# Patient Record
Sex: MALE | Race: WHITE | NOT HISPANIC OR LATINO | Employment: FULL TIME | URBAN - METROPOLITAN AREA
[De-identification: names, ages, dates, MRNs, and addresses within clinical notes are randomized per-mention and may not be internally consistent; named-entity substitution may affect disease eponyms.]

---

## 2023-07-14 ENCOUNTER — HOSPITAL ENCOUNTER (EMERGENCY)
Facility: MEDICAL CENTER | Age: 43
End: 2023-07-14
Attending: EMERGENCY MEDICINE
Payer: MEDICAID

## 2023-07-14 VITALS
OXYGEN SATURATION: 95 % | HEART RATE: 90 BPM | DIASTOLIC BLOOD PRESSURE: 90 MMHG | BODY MASS INDEX: 45.1 KG/M2 | SYSTOLIC BLOOD PRESSURE: 145 MMHG | RESPIRATION RATE: 16 BRPM | TEMPERATURE: 98 F | HEIGHT: 70 IN | WEIGHT: 315 LBS

## 2023-07-14 DIAGNOSIS — L03.115 CELLULITIS OF RIGHT LOWER EXTREMITY: ICD-10-CM

## 2023-07-14 LAB
BASOPHILS # BLD AUTO: 0.5 % (ref 0–1.8)
BASOPHILS # BLD: 0.04 K/UL (ref 0–0.12)
EOSINOPHIL # BLD AUTO: 0.13 K/UL (ref 0–0.51)
EOSINOPHIL NFR BLD: 1.7 % (ref 0–6.9)
ERYTHROCYTE [DISTWIDTH] IN BLOOD BY AUTOMATED COUNT: 46.5 FL (ref 35.9–50)
HCT VFR BLD AUTO: 47.4 % (ref 42–52)
HGB BLD-MCNC: 15.8 G/DL (ref 14–18)
IMM GRANULOCYTES # BLD AUTO: 0.04 K/UL (ref 0–0.11)
IMM GRANULOCYTES NFR BLD AUTO: 0.5 % (ref 0–0.9)
LYMPHOCYTES # BLD AUTO: 3.04 K/UL (ref 1–4.8)
LYMPHOCYTES NFR BLD: 39.9 % (ref 22–41)
MCH RBC QN AUTO: 29.9 PG (ref 27–33)
MCHC RBC AUTO-ENTMCNC: 33.3 G/DL (ref 32.3–36.5)
MCV RBC AUTO: 89.8 FL (ref 81.4–97.8)
MONOCYTES # BLD AUTO: 0.63 K/UL (ref 0–0.85)
MONOCYTES NFR BLD AUTO: 8.3 % (ref 0–13.4)
NEUTROPHILS # BLD AUTO: 3.73 K/UL (ref 1.82–7.42)
NEUTROPHILS NFR BLD: 49.1 % (ref 44–72)
NRBC # BLD AUTO: 0 K/UL
NRBC BLD-RTO: 0 /100 WBC (ref 0–0.2)
PLATELET # BLD AUTO: 239 K/UL (ref 164–446)
PMV BLD AUTO: 10.8 FL (ref 9–12.9)
RBC # BLD AUTO: 5.28 M/UL (ref 4.7–6.1)
WBC # BLD AUTO: 7.6 K/UL (ref 4.8–10.8)

## 2023-07-14 PROCEDURE — A9270 NON-COVERED ITEM OR SERVICE: HCPCS | Mod: UD | Performed by: EMERGENCY MEDICINE

## 2023-07-14 PROCEDURE — 96365 THER/PROPH/DIAG IV INF INIT: CPT

## 2023-07-14 PROCEDURE — 700105 HCHG RX REV CODE 258: Mod: UD | Performed by: EMERGENCY MEDICINE

## 2023-07-14 PROCEDURE — 700102 HCHG RX REV CODE 250 W/ 637 OVERRIDE(OP): Mod: UD | Performed by: EMERGENCY MEDICINE

## 2023-07-14 PROCEDURE — 700111 HCHG RX REV CODE 636 W/ 250 OVERRIDE (IP): Mod: JZ,UD | Performed by: EMERGENCY MEDICINE

## 2023-07-14 PROCEDURE — 85025 COMPLETE CBC W/AUTO DIFF WBC: CPT

## 2023-07-14 PROCEDURE — 36415 COLL VENOUS BLD VENIPUNCTURE: CPT

## 2023-07-14 PROCEDURE — 99284 EMERGENCY DEPT VISIT MOD MDM: CPT

## 2023-07-14 RX ORDER — SULFAMETHOXAZOLE AND TRIMETHOPRIM 800; 160 MG/1; MG/1
1 TABLET ORAL 2 TIMES DAILY
Qty: 14 TABLET | Refills: 0 | Status: SHIPPED | OUTPATIENT
Start: 2023-07-14 | End: 2023-07-14 | Stop reason: SDUPTHER

## 2023-07-14 RX ORDER — SULFAMETHOXAZOLE AND TRIMETHOPRIM 800; 160 MG/1; MG/1
1 TABLET ORAL 2 TIMES DAILY
Qty: 14 TABLET | Refills: 0 | Status: ACTIVE | OUTPATIENT
Start: 2023-07-14 | End: 2023-07-21

## 2023-07-14 RX ORDER — AMOXICILLIN AND CLAVULANATE POTASSIUM 875; 125 MG/1; MG/1
1 TABLET, FILM COATED ORAL 2 TIMES DAILY
Qty: 14 TABLET | Refills: 0 | Status: ACTIVE | OUTPATIENT
Start: 2023-07-14 | End: 2023-07-21

## 2023-07-14 RX ORDER — AMOXICILLIN AND CLAVULANATE POTASSIUM 875; 125 MG/1; MG/1
1 TABLET, FILM COATED ORAL 2 TIMES DAILY
Qty: 14 TABLET | Refills: 0 | Status: SHIPPED | OUTPATIENT
Start: 2023-07-14 | End: 2023-07-14 | Stop reason: SDUPTHER

## 2023-07-14 RX ORDER — SULFAMETHOXAZOLE AND TRIMETHOPRIM 800; 160 MG/1; MG/1
2 TABLET ORAL ONCE
Status: COMPLETED | OUTPATIENT
Start: 2023-07-14 | End: 2023-07-14

## 2023-07-14 RX ADMIN — SULFAMETHOXAZOLE AND TRIMETHOPRIM 2 TABLET: 800; 160 TABLET ORAL at 18:20

## 2023-07-14 RX ADMIN — AMPICILLIN AND SULBACTAM 3 G: 1; 2 INJECTION, POWDER, FOR SOLUTION INTRAMUSCULAR; INTRAVENOUS at 18:20

## 2023-07-14 NOTE — ED TRIAGE NOTES
"Dejan Putnam presents to triage with family for     Chief Complaint   Patient presents with    Wound Check     Pt reports that he had R ankle surgery this last May in [place], [place].  Pt states sutures were removed in [place] approx 3-4 weeks ago, but he states he has found more sutures around the incision.  Pt reports incision slightly swollen and red.     Pt reports recently moved back to [place] and needs to establish care.  VSS, NAD.  Denies fevers, minimal pain.  BP (!) 149/93   Pulse 95   Temp 35.9 °C (96.7 °F) (Temporal)   Resp 18   Ht 1.778 m (5' 10\")   Wt (!) 146 kg (321 lb 10.4 oz)   SpO2 95%   BMI 46.15 kg/m²     "

## 2023-07-15 NOTE — ED PROVIDER NOTES
ED Provider Note    CHIEF COMPLAINT  Chief Complaint   Patient presents with    Wound Check     Pt reports that he had R ankle surgery this last May in [place], [place].  Pt states sutures were removed in [facility] 3-4 weeks ago, but he states he has found more sutures around the incision.  Pt reports incision slightly swollen and red.       EXTERNAL RECORDS REVIEWED  Multiple emergency medicine notes    HPI/ROS    OUTSIDE HISTORIAN(S):  Family patient's wife stating that she removed sutures this morning that were embedded in mesh.  They do have sutures removed approximately 3 to 4 weeks ago in [facility].  He initially broke his ankle May 31 and had surgery in [facility] and had no complications since then.  He has had swelling and redness since removing the sutures yesterday.    Dejan Barrios is a 43 y.o. male who presents complaint of swelling to his right lateral leg.  The patient fell getting out of the shower on May 31, 2023 and had surgery with a taylor placed in his ankle in [facility].  He followed up in [facility] 3 to 4 weeks ago they removed the sutures.  They noticed that there were black sutures coming out of the wound yesterday and they pulled them out.  Since that time, he has had swelling and redness increasing severity along the incision site.  He has had no increasing pain, fever, discharge from the wound.  He states he is able to ambulate, his ankle does not hurt and has excellent range of motion.    PAST MEDICAL HISTORY   has a past medical history of Asthma, Psychiatric disorder, and Seizure disorder.    SURGICAL HISTORY   has a past surgical history that includes appendectomy and other abdominal surgery.    FAMILY HISTORY  Family History   Problem Relation Age of Onset    Hypertension Father        SOCIAL HISTORY  Social History     Tobacco Use    Smoking status: Every Day     Packs/day: 0.50     Years: 15.00     Pack years: 7.50     Types: Cigarettes    Smokeless  "tobacco: Not on file    Tobacco comments:     1/2 pack per day   Substance and Sexual Activity    Alcohol use: Yes     Comment: \"a couple beers a day\"    Drug use: No    Sexual activity: Not on file       CURRENT MEDICATIONS  Home Medications    **Home medications have not yet been reviewed for this encounter**         ALLERGIES  No Known Allergies    PHYSICAL EXAM  VITAL SIGNS: BP (!) 149/93   Pulse 95   Temp 35.9 °C (96.7 °F) (Temporal)   Resp 18   Ht 1.778 m (5' 10\")   Wt (!) 146 kg (321 lb 10.4 oz)   SpO2 95%   BMI 46.15 kg/m²      Nursing notes and vitals reviewed.  Constitutional: Well developed, Well nourished, No acute distress, Non-toxic appearance.   Eyes: PERRLA, EOMI, Conjunctiva normal, No discharge.   HENT: Normocephalic, Atraumatic, moist mucous membranes, no facial edema Cardiovascular: Normal heart rate, Normal rhythm, No murmurs, No rubs, No gallops.   Thorax & Lungs: No respiratory distress, No rales, No rhonchi, No wheezing, No chest tenderness.   Imagin cm incision lateral aspect of the right lower extremity, slight erythema around the most proximal aspect of the incision, no discharge, no fluctuance  Extremities: Findings as above, excellent range of motion at the ankle joint, distal cap refill less than 2 seconds   neurologic: Strength and sensation intact right lower extremity  Psychiatric: Affect normal for clinical presentation.        DIAGNOSTIC STUDIES / PROCEDURES      LABS  Results for orders placed or performed during the hospital encounter of 23   CBC WITH DIFFERENTIAL   Result Value Ref Range    WBC 7.6 4.8 - 10.8 K/uL    RBC 5.28 4.70 - 6.10 M/uL    Hemoglobin 15.8 14.0 - 18.0 g/dL    Hematocrit 47.4 42.0 - 52.0 %    MCV 89.8 81.4 - 97.8 fL    MCH 29.9 27.0 - 33.0 pg    MCHC 33.3 32.3 - 36.5 g/dL    RDW 46.5 35.9 - 50.0 fL    Platelet Count 239 164 - 446 K/uL    MPV 10.8 9.0 - 12.9 fL    Neutrophils-Polys 49.10 44.00 - 72.00 %    Lymphocytes 39.90 22.00 - 41.00 %    " Monocytes 8.30 0.00 - 13.40 %    Eosinophils 1.70 0.00 - 6.90 %    Basophils 0.50 0.00 - 1.80 %    Immature Granulocytes 0.50 0.00 - 0.90 %    Nucleated RBC 0.00 0.00 - 0.20 /100 WBC    Neutrophils (Absolute) 3.73 1.82 - 7.42 K/uL    Lymphs (Absolute) 3.04 1.00 - 4.80 K/uL    Monos (Absolute) 0.63 0.00 - 0.85 K/uL    Eos (Absolute) 0.13 0.00 - 0.51 K/uL    Baso (Absolute) 0.04 0.00 - 0.12 K/uL    Immature Granulocytes (abs) 0.04 0.00 - 0.11 K/uL    NRBC (Absolute) 0.00 K/uL         COURSE & MEDICAL DECISION MAKING    ED Observation Status? No; Patient does not meet criteria for ED Observation.     INITIAL ASSESSMENT, COURSE AND PLAN  Care Narrative: This is a pleasant 43-year-old male that presents with cellulitis to the right lateral lower extremity.  He had surgery on his ankle in May 2023.  They pulled out sutures yesterday that were retained in probably sparked his cellulitis.  Here in the emergency department, he has no Inse abscess, no Inse leukocytosis and do not believe he has bacteremia or significant infection.  In addition he has excellent range of motion at the ankle and I do not believe is a septic joint.  As concern for possible catastrophic response therefore the patient received Unasyn as well as Bactrim here in the emergency department prescription for Augmentin and Bactrim have been given.  The patient understands the need to return to the emergency department within 24 hours for reevaluation return sooner if he has increasing symptoms such as red streaking, fever, significant discharge from the wound.    The patient was found to have elevated blood pressure here in the emergency department is not diagnosed therefore asked her to follow the primary care physician for outpatient evaluation and management.  ADDITIONAL PROBLEM LIST  Escalation of care considered, and ultimately not performed:acute inpatient care management, however at this time, the patient is most appropriate for outpatient  management      Decision tools and prescription drugs considered including, but not limited to: I did consider imaging but here in the emergency department the patient had no evidence of leukocytosis, and excellent range of motion I do not believe as septic joint therefore imaging was not completed.  FINAL DIAGNOSIS  1. Cellulitis of right lower extremity          DISPOSITION:  Patient will be discharged home in stable condition.    FOLLOW UP:  [facility], Emergency Dept  [address] [facility] [id number] [phone]  In 1 day  If symptoms worsen    Elías Wick M.D.  [address] [facility] [address]  [phone]    Schedule an appointment as soon as possible for a visit in 1 week        OUTPATIENT MEDICATIONS:  Current Discharge Medication List        START taking these medications    Details   amoxicillin-clavulanate (AUGMENTIN) 875-125 MG Tab Take 1 Tablet by mouth 2 times a day for 7 days.  Qty: 14 Tablet, Refills: 0    Associated Diagnoses: Cellulitis of right lower extremity      sulfamethoxazole-trimethoprim (BACTRIM DS) 800-160 MG tablet Take 1 Tablet by mouth 2 times a day for 7 days.  Qty: 14 Tablet, Refills: 0    Associated Diagnoses: Cellulitis of right lower extremity               Electronically signed by: Prashant Hutchison D.O., 7/14/2023 6:03 PM

## 2023-07-15 NOTE — ED NOTES
All discharge instructions given to pt.    Pt verbalized understanding of all discharge instructions. All lines removed prior to discharge. All questions answered. All personal belongings sent with pt. Pt ambulatory to [facility].

## 2023-07-15 NOTE — DISCHARGE INSTRUCTIONS
Return to the emergency department in 24 hours if you have of increasing redness or the redn streaks past the clair on your leg.    Follow up with your primary care physician for re-evaluation of your blood pressure.